# Patient Record
Sex: FEMALE | Race: WHITE | Employment: FULL TIME | URBAN - METROPOLITAN AREA
[De-identification: names, ages, dates, MRNs, and addresses within clinical notes are randomized per-mention and may not be internally consistent; named-entity substitution may affect disease eponyms.]

---

## 2018-04-17 ENCOUNTER — HOSPITAL ENCOUNTER (EMERGENCY)
Age: 48
Discharge: HOME | End: 2018-04-17
Payer: COMMERCIAL

## 2018-04-17 ENCOUNTER — APPOINTMENT (OUTPATIENT)
Dept: GENERAL RADIOLOGY | Age: 48
End: 2018-04-17
Payer: COMMERCIAL

## 2018-04-17 VITALS
SYSTOLIC BLOOD PRESSURE: 181 MMHG | BODY MASS INDEX: 29.82 KG/M2 | OXYGEN SATURATION: 96 % | DIASTOLIC BLOOD PRESSURE: 82 MMHG | RESPIRATION RATE: 16 BRPM | TEMPERATURE: 98.6 F | WEIGHT: 190 LBS | HEART RATE: 45 BPM | HEIGHT: 67 IN

## 2018-04-17 DIAGNOSIS — S40.029A CONTUSION, SHOULDER AND UPPER ARM, MULTIPLE SITES, UNSPECIFIED LATERALITY, INITIAL ENCOUNTER: ICD-10-CM

## 2018-04-17 DIAGNOSIS — T14.8XXA HEMATOMA: ICD-10-CM

## 2018-04-17 DIAGNOSIS — S40.019A CONTUSION, SHOULDER AND UPPER ARM, MULTIPLE SITES, UNSPECIFIED LATERALITY, INITIAL ENCOUNTER: ICD-10-CM

## 2018-04-17 DIAGNOSIS — S90.30XA CONTUSION OF FOOT, UNSPECIFIED LATERALITY, INITIAL ENCOUNTER: Primary | ICD-10-CM

## 2018-04-17 PROCEDURE — 99283 EMERGENCY DEPT VISIT LOW MDM: CPT

## 2018-04-17 PROCEDURE — 73630 X-RAY EXAM OF FOOT: CPT

## 2018-04-17 PROCEDURE — 73080 X-RAY EXAM OF ELBOW: CPT

## 2018-04-17 PROCEDURE — 73060 X-RAY EXAM OF HUMERUS: CPT

## 2018-04-17 RX ORDER — TRAMADOL HYDROCHLORIDE 50 MG/1
50 TABLET ORAL EVERY 6 HOURS PRN
Qty: 6 TABLET | Refills: 0 | Status: SHIPPED | OUTPATIENT
Start: 2018-04-17 | End: 2018-04-22

## 2018-04-17 ASSESSMENT — PAIN DESCRIPTION - DESCRIPTORS: DESCRIPTORS: ACHING

## 2018-04-17 ASSESSMENT — PAIN DESCRIPTION - PAIN TYPE: TYPE: ACUTE PAIN

## 2018-04-17 ASSESSMENT — PAIN DESCRIPTION - LOCATION: LOCATION: ARM

## 2018-04-17 ASSESSMENT — PAIN SCALES - GENERAL: PAINLEVEL_OUTOF10: 7

## 2018-11-30 ENCOUNTER — APPOINTMENT (OUTPATIENT)
Dept: CT IMAGING | Age: 48
End: 2018-11-30
Payer: COMMERCIAL

## 2018-11-30 ENCOUNTER — HOSPITAL ENCOUNTER (EMERGENCY)
Age: 48
Discharge: HOME | End: 2018-11-30
Payer: COMMERCIAL

## 2018-11-30 VITALS
RESPIRATION RATE: 18 BRPM | OXYGEN SATURATION: 97 % | TEMPERATURE: 98.3 F | BODY MASS INDEX: 29.71 KG/M2 | HEART RATE: 54 BPM | DIASTOLIC BLOOD PRESSURE: 88 MMHG | HEIGHT: 67 IN | WEIGHT: 189.31 LBS | SYSTOLIC BLOOD PRESSURE: 145 MMHG

## 2018-11-30 DIAGNOSIS — N20.0 KIDNEY STONE: Primary | ICD-10-CM

## 2018-11-30 LAB
BACTERIA: ABNORMAL /HPF
BASOPHILS ABSOLUTE: 0.04 E9/L (ref 0–0.2)
BASOPHILS RELATIVE PERCENT: 0.5 % (ref 0–2)
BILIRUBIN URINE: NEGATIVE
BLOOD, URINE: ABNORMAL
CHP ED QC CHECK: YES
CLARITY: ABNORMAL
CO2: 28 MMOL/L (ref 22–29)
COLOR: YELLOW
EOSINOPHILS ABSOLUTE: 0.2 E9/L (ref 0.05–0.5)
EOSINOPHILS RELATIVE PERCENT: 2.4 % (ref 0–6)
EPITHELIAL CELLS, UA: ABNORMAL /HPF
GFR AFRICAN AMERICAN: >60
GFR NON-AFRICAN AMERICAN: >60 ML/MIN/1.73
GLUCOSE BLD-MCNC: 100 MG/DL (ref 74–99)
GLUCOSE URINE: NEGATIVE MG/DL
HCT VFR BLD CALC: 42.7 % (ref 34–48)
HEMOGLOBIN: 14 G/DL (ref 11.5–15.5)
IMMATURE GRANULOCYTES #: 0.05 E9/L
IMMATURE GRANULOCYTES %: 0.6 % (ref 0–5)
KETONES, URINE: NEGATIVE MG/DL
LEUKOCYTE ESTERASE, URINE: NEGATIVE
LYMPHOCYTES ABSOLUTE: 1.14 E9/L (ref 1.5–4)
LYMPHOCYTES RELATIVE PERCENT: 14 % (ref 20–42)
MCH RBC QN AUTO: 28.1 PG (ref 26–35)
MCHC RBC AUTO-ENTMCNC: 32.8 % (ref 32–34.5)
MCV RBC AUTO: 85.6 FL (ref 80–99.9)
MONOCYTES ABSOLUTE: 0.61 E9/L (ref 0.1–0.95)
MONOCYTES RELATIVE PERCENT: 7.5 % (ref 2–12)
NEUTROPHILS ABSOLUTE: 6.13 E9/L (ref 1.8–7.3)
NEUTROPHILS RELATIVE PERCENT: 75 % (ref 43–80)
NITRITE, URINE: NEGATIVE
PDW BLD-RTO: 13 FL (ref 11.5–15)
PH UA: 7 (ref 5–9)
PLATELET # BLD: 219 E9/L (ref 130–450)
PMV BLD AUTO: 10.5 FL (ref 7–12)
POC ANION GAP: 11 MMOL/L (ref 7–16)
POC BUN: 25 MG/DL (ref 8–23)
POC CHLORIDE: 104 MMOL/L (ref 100–108)
POC CREATININE: 0.8 MG/DL (ref 0.5–1)
POC POTASSIUM: 4 MMOL/L (ref 3.5–5)
POC SODIUM: 143 MMOL/L (ref 132–146)
PREGNANCY TEST URINE, POC: NEGATIVE
PROTEIN UA: NEGATIVE MG/DL
RBC # BLD: 4.99 E12/L (ref 3.5–5.5)
RBC UA: >20 /HPF (ref 0–2)
SPECIFIC GRAVITY UA: 1.01 (ref 1–1.03)
UROBILINOGEN, URINE: 1 E.U./DL
WBC # BLD: 8.2 E9/L (ref 4.5–11.5)
WBC UA: ABNORMAL /HPF (ref 0–5)

## 2018-11-30 PROCEDURE — 85025 COMPLETE CBC W/AUTO DIFF WBC: CPT

## 2018-11-30 PROCEDURE — 6360000002 HC RX W HCPCS: Performed by: PHYSICIAN ASSISTANT

## 2018-11-30 PROCEDURE — 82947 ASSAY GLUCOSE BLOOD QUANT: CPT

## 2018-11-30 PROCEDURE — 74176 CT ABD & PELVIS W/O CONTRAST: CPT

## 2018-11-30 PROCEDURE — 82565 ASSAY OF CREATININE: CPT

## 2018-11-30 PROCEDURE — 81001 URINALYSIS AUTO W/SCOPE: CPT

## 2018-11-30 PROCEDURE — 99284 EMERGENCY DEPT VISIT MOD MDM: CPT

## 2018-11-30 PROCEDURE — 80051 ELECTROLYTE PANEL: CPT

## 2018-11-30 PROCEDURE — 36415 COLL VENOUS BLD VENIPUNCTURE: CPT

## 2018-11-30 PROCEDURE — 96375 TX/PRO/DX INJ NEW DRUG ADDON: CPT

## 2018-11-30 PROCEDURE — 2580000003 HC RX 258: Performed by: PHYSICIAN ASSISTANT

## 2018-11-30 PROCEDURE — 84520 ASSAY OF UREA NITROGEN: CPT

## 2018-11-30 PROCEDURE — 96374 THER/PROPH/DIAG INJ IV PUSH: CPT

## 2018-11-30 RX ORDER — HYDROCODONE BITARTRATE AND ACETAMINOPHEN 5; 325 MG/1; MG/1
1 TABLET ORAL EVERY 6 HOURS PRN
Qty: 12 TABLET | Refills: 0 | Status: SHIPPED | OUTPATIENT
Start: 2018-11-30 | End: 2018-12-03

## 2018-11-30 RX ORDER — TAMSULOSIN HYDROCHLORIDE 0.4 MG/1
0.4 CAPSULE ORAL DAILY
Qty: 14 CAPSULE | Refills: 0 | Status: SHIPPED | OUTPATIENT
Start: 2018-11-30 | End: 2021-06-16 | Stop reason: ALTCHOICE

## 2018-11-30 RX ORDER — 0.9 % SODIUM CHLORIDE 0.9 %
1000 INTRAVENOUS SOLUTION INTRAVENOUS ONCE
Status: COMPLETED | OUTPATIENT
Start: 2018-11-30 | End: 2018-11-30

## 2018-11-30 RX ORDER — ONDANSETRON 2 MG/ML
4 INJECTION INTRAMUSCULAR; INTRAVENOUS ONCE
Status: COMPLETED | OUTPATIENT
Start: 2018-11-30 | End: 2018-11-30

## 2018-11-30 RX ORDER — ONDANSETRON 4 MG/1
4 TABLET, ORALLY DISINTEGRATING ORAL EVERY 8 HOURS PRN
Qty: 10 TABLET | Refills: 0 | Status: SHIPPED | OUTPATIENT
Start: 2018-11-30 | End: 2021-06-16 | Stop reason: ALTCHOICE

## 2018-11-30 RX ORDER — KETOROLAC TROMETHAMINE 15 MG/ML
15 INJECTION, SOLUTION INTRAMUSCULAR; INTRAVENOUS ONCE
Status: COMPLETED | OUTPATIENT
Start: 2018-11-30 | End: 2018-11-30

## 2018-11-30 RX ORDER — PROPRANOLOL HYDROCHLORIDE 10 MG/1
10 TABLET ORAL 3 TIMES DAILY
COMMUNITY

## 2018-11-30 RX ADMIN — KETOROLAC TROMETHAMINE 15 MG: 15 INJECTION, SOLUTION INTRAMUSCULAR; INTRAVENOUS at 13:37

## 2018-11-30 RX ADMIN — ONDANSETRON 4 MG: 2 INJECTION INTRAMUSCULAR; INTRAVENOUS at 13:41

## 2018-11-30 RX ADMIN — SODIUM CHLORIDE 1000 ML: 9 INJECTION, SOLUTION INTRAVENOUS at 13:37

## 2018-11-30 ASSESSMENT — PAIN DESCRIPTION - PAIN TYPE: TYPE: ACUTE PAIN

## 2018-11-30 ASSESSMENT — PAIN SCALES - GENERAL
PAINLEVEL_OUTOF10: 8
PAINLEVEL_OUTOF10: 8

## 2018-11-30 ASSESSMENT — PAIN DESCRIPTION - LOCATION: LOCATION: FLANK

## 2018-11-30 ASSESSMENT — PAIN DESCRIPTION - DESCRIPTORS: DESCRIPTORS: SHARP;SHOOTING

## 2018-11-30 ASSESSMENT — PAIN DESCRIPTION - ORIENTATION: ORIENTATION: LEFT

## 2018-11-30 NOTE — ED PROVIDER NOTES
Negative mg/dL    Specific Gravity, UA 1.015 1.005 - 1.030    Blood, Urine LARGE (A) Negative    pH, UA 7.0 5.0 - 9.0    Protein, UA Negative Negative mg/dL    Urobilinogen, Urine 1.0 <2.0 E.U./dL    Nitrite, Urine Negative Negative    Leukocyte Esterase, Urine Negative Negative   Microscopic Urinalysis   Result Value Ref Range    WBC, UA 0-1 0 - 5 /HPF    RBC, UA >20 0 - 2 /HPF    Epi Cells RARE /HPF    Bacteria, UA FEW (A) /HPF   POC Pregnancy Urine Qual   Result Value Ref Range    Preg Test, Ur negative     QC OK? yes    POCT Venous   Result Value Ref Range    POC Sodium 143 132 - 146 mmol/L    POC Potassium 4.0 3.5 - 5.0 mmol/L    POC Chloride 104 100 - 108 mmol/L    CO2 28 22 - 29 mmol/L    POC Anion Gap 11 7 - 16 mmol/L    POC Glucose 100 (H) 74 - 99 mg/dl    POC BUN 25 (H) 8 - 23 mg/dL    POC Creatinine 0.8 0.5 - 1.0 mg/dL    GFR Non-African American >60 >=60 mL/min/1.73    GFR  >60        Imaging: All Radiology results interpreted by Radiologist unless otherwise noted. CT ABDOMEN PELVIS WO CONTRAST   Final Result   1. Small obstructing stone in the distal left ureter. 2. Nonspecific right hydronephrosis possibly related to a low-grade   UPJ. ED Course / Medical Decision Making     Medications   0.9 % sodium chloride bolus (1,000 mLs Intravenous New Bag 11/30/18 1337)   ketorolac (TORADOL) injection 15 mg (15 mg Intravenous Given 11/30/18 1337)   ondansetron (ZOFRAN) injection 4 mg (4 mg Intravenous Given 11/30/18 1341)        Re-examination:  11/30/18       Time: 1430   Improved. Reports pain is down to a 1/10. Feeling much better. Updated on results. Consult(s):   None    Procedure(s):   none. MDM:   Patient appears well. 3mm kidney stone present. Labs, UA are essentially unremarkable otherwise. Patient has good pain control. Advised to return to the ER if worse in any way. Otherwise to f/u w/urology. Counseling:     The emergency provider has spoken with the patient and

## 2018-11-30 NOTE — ED NOTES
Pt. C/o L.  Sided flank pain since this morning     David Yu [facility], RN  11/30/18 8158

## 2021-06-15 ENCOUNTER — HOSPITAL ENCOUNTER (EMERGENCY)
Age: 51
Discharge: HOME | End: 2021-06-16
Attending: EMERGENCY MEDICINE
Payer: COMMERCIAL

## 2021-06-15 DIAGNOSIS — N20.0 KIDNEY STONE: Primary | ICD-10-CM

## 2021-06-15 DIAGNOSIS — R82.71 ASB (ASYMPTOMATIC BACTERIURIA): ICD-10-CM

## 2021-06-15 PROCEDURE — 99284 EMERGENCY DEPT VISIT MOD MDM: CPT

## 2021-06-15 PROCEDURE — 96375 TX/PRO/DX INJ NEW DRUG ADDON: CPT

## 2021-06-15 PROCEDURE — 96374 THER/PROPH/DIAG INJ IV PUSH: CPT

## 2021-06-15 ASSESSMENT — PAIN SCALES - GENERAL: PAINLEVEL_OUTOF10: 7

## 2021-06-16 ENCOUNTER — APPOINTMENT (OUTPATIENT)
Dept: CT IMAGING | Age: 51
End: 2021-06-16
Payer: COMMERCIAL

## 2021-06-16 VITALS
TEMPERATURE: 97.4 F | OXYGEN SATURATION: 95 % | HEIGHT: 67 IN | DIASTOLIC BLOOD PRESSURE: 93 MMHG | WEIGHT: 204 LBS | HEART RATE: 62 BPM | BODY MASS INDEX: 32.02 KG/M2 | RESPIRATION RATE: 18 BRPM | SYSTOLIC BLOOD PRESSURE: 154 MMHG

## 2021-06-16 LAB
ALBUMIN SERPL-MCNC: 4.3 G/DL (ref 3.5–5.2)
ALP BLD-CCNC: 58 U/L (ref 35–104)
ALT SERPL-CCNC: 27 U/L (ref 0–32)
ANION GAP SERPL CALCULATED.3IONS-SCNC: 13 MMOL/L (ref 7–16)
AST SERPL-CCNC: 26 U/L (ref 0–31)
BACTERIA: ABNORMAL /HPF
BASOPHILS ABSOLUTE: 0.04 E9/L (ref 0–0.2)
BASOPHILS RELATIVE PERCENT: 0.5 % (ref 0–2)
BILIRUB SERPL-MCNC: 0.4 MG/DL (ref 0–1.2)
BILIRUBIN URINE: NEGATIVE
BLOOD, URINE: ABNORMAL
BUN BLDV-MCNC: 29 MG/DL (ref 6–20)
CALCIUM SERPL-MCNC: 9.8 MG/DL (ref 8.6–10.2)
CHLORIDE BLD-SCNC: 99 MMOL/L (ref 98–107)
CLARITY: CLEAR
CO2: 24 MMOL/L (ref 22–29)
COLOR: ABNORMAL
CREAT SERPL-MCNC: 0.9 MG/DL (ref 0.5–1)
EOSINOPHILS ABSOLUTE: 0.22 E9/L (ref 0.05–0.5)
EOSINOPHILS RELATIVE PERCENT: 2.6 % (ref 0–6)
GFR AFRICAN AMERICAN: >60
GFR NON-AFRICAN AMERICAN: >60 ML/MIN/1.73
GLUCOSE BLD-MCNC: 109 MG/DL (ref 74–99)
GLUCOSE URINE: NEGATIVE MG/DL
HCT VFR BLD CALC: 41.5 % (ref 34–48)
HEMOGLOBIN: 14 G/DL (ref 11.5–15.5)
IMMATURE GRANULOCYTES #: 0.02 E9/L
IMMATURE GRANULOCYTES %: 0.2 % (ref 0–5)
KETONES, URINE: NEGATIVE MG/DL
LEUKOCYTE ESTERASE, URINE: ABNORMAL
LYMPHOCYTES ABSOLUTE: 1.65 E9/L (ref 1.5–4)
LYMPHOCYTES RELATIVE PERCENT: 19.5 % (ref 20–42)
MCH RBC QN AUTO: 28.2 PG (ref 26–35)
MCHC RBC AUTO-ENTMCNC: 33.7 % (ref 32–34.5)
MCV RBC AUTO: 83.7 FL (ref 80–99.9)
MONOCYTES ABSOLUTE: 0.85 E9/L (ref 0.1–0.95)
MONOCYTES RELATIVE PERCENT: 10 % (ref 2–12)
NEUTROPHILS ABSOLUTE: 5.68 E9/L (ref 1.8–7.3)
NEUTROPHILS RELATIVE PERCENT: 67.2 % (ref 43–80)
NITRITE, URINE: NEGATIVE
PDW BLD-RTO: 12.8 FL (ref 11.5–15)
PH UA: 7.5 (ref 5–9)
PLATELET # BLD: 225 E9/L (ref 130–450)
PMV BLD AUTO: 10.2 FL (ref 7–12)
POTASSIUM REFLEX MAGNESIUM: 4.1 MMOL/L (ref 3.5–5)
PROTEIN UA: NEGATIVE MG/DL
RBC # BLD: 4.96 E12/L (ref 3.5–5.5)
RBC UA: ABNORMAL /HPF (ref 0–2)
SODIUM BLD-SCNC: 136 MMOL/L (ref 132–146)
SPECIFIC GRAVITY UA: 1.01 (ref 1–1.03)
TOTAL PROTEIN: 7.4 G/DL (ref 6.4–8.3)
UROBILINOGEN, URINE: 0.2 E.U./DL
WBC # BLD: 8.5 E9/L (ref 4.5–11.5)
WBC UA: ABNORMAL /HPF (ref 0–5)

## 2021-06-16 PROCEDURE — 74176 CT ABD & PELVIS W/O CONTRAST: CPT

## 2021-06-16 PROCEDURE — 80053 COMPREHEN METABOLIC PANEL: CPT

## 2021-06-16 PROCEDURE — 6360000002 HC RX W HCPCS: Performed by: STUDENT IN AN ORGANIZED HEALTH CARE EDUCATION/TRAINING PROGRAM

## 2021-06-16 PROCEDURE — 2580000003 HC RX 258: Performed by: STUDENT IN AN ORGANIZED HEALTH CARE EDUCATION/TRAINING PROGRAM

## 2021-06-16 PROCEDURE — 81001 URINALYSIS AUTO W/SCOPE: CPT

## 2021-06-16 PROCEDURE — 85025 COMPLETE CBC W/AUTO DIFF WBC: CPT

## 2021-06-16 PROCEDURE — 36415 COLL VENOUS BLD VENIPUNCTURE: CPT

## 2021-06-16 PROCEDURE — 87088 URINE BACTERIA CULTURE: CPT

## 2021-06-16 RX ORDER — ONDANSETRON 2 MG/ML
4 INJECTION INTRAMUSCULAR; INTRAVENOUS ONCE
Status: COMPLETED | OUTPATIENT
Start: 2021-06-16 | End: 2021-06-16

## 2021-06-16 RX ORDER — KETOROLAC TROMETHAMINE 10 MG/1
10 TABLET, FILM COATED ORAL EVERY 6 HOURS PRN
Qty: 20 TABLET | Refills: 0 | Status: SHIPPED | OUTPATIENT
Start: 2021-06-16 | End: 2021-06-21

## 2021-06-16 RX ORDER — ONDANSETRON 4 MG/1
4 TABLET, ORALLY DISINTEGRATING ORAL EVERY 8 HOURS PRN
Qty: 24 TABLET | Refills: 0 | Status: SHIPPED | OUTPATIENT
Start: 2021-06-16

## 2021-06-16 RX ORDER — 0.9 % SODIUM CHLORIDE 0.9 %
1000 INTRAVENOUS SOLUTION INTRAVENOUS ONCE
Status: COMPLETED | OUTPATIENT
Start: 2021-06-16 | End: 2021-06-16

## 2021-06-16 RX ORDER — KETOROLAC TROMETHAMINE 30 MG/ML
15 INJECTION, SOLUTION INTRAMUSCULAR; INTRAVENOUS ONCE
Status: COMPLETED | OUTPATIENT
Start: 2021-06-16 | End: 2021-06-16

## 2021-06-16 RX ORDER — KETOROLAC TROMETHAMINE 30 MG/ML
INJECTION, SOLUTION INTRAMUSCULAR; INTRAVENOUS
Status: DISCONTINUED
Start: 2021-06-16 | End: 2021-06-16 | Stop reason: HOSPADM

## 2021-06-16 RX ORDER — ONDANSETRON 2 MG/ML
INJECTION INTRAMUSCULAR; INTRAVENOUS
Status: DISCONTINUED
Start: 2021-06-16 | End: 2021-06-16 | Stop reason: HOSPADM

## 2021-06-16 RX ORDER — TAMSULOSIN HYDROCHLORIDE 0.4 MG/1
0.4 CAPSULE ORAL DAILY
Qty: 14 CAPSULE | Refills: 0 | Status: SHIPPED | OUTPATIENT
Start: 2021-06-16 | End: 2021-06-30

## 2021-06-16 RX ORDER — HYDROCODONE BITARTRATE AND ACETAMINOPHEN 5; 325 MG/1; MG/1
1 TABLET ORAL EVERY 8 HOURS PRN
Qty: 9 TABLET | Refills: 0 | Status: SHIPPED | OUTPATIENT
Start: 2021-06-16 | End: 2021-06-19

## 2021-06-16 RX ADMIN — SODIUM CHLORIDE 1000 ML: 9 INJECTION, SOLUTION INTRAVENOUS at 00:11

## 2021-06-16 RX ADMIN — KETOROLAC TROMETHAMINE 15 MG: 30 INJECTION, SOLUTION INTRAMUSCULAR; INTRAVENOUS at 00:11

## 2021-06-16 RX ADMIN — ONDANSETRON 4 MG: 2 INJECTION INTRAMUSCULAR; INTRAVENOUS at 00:11

## 2021-06-16 ASSESSMENT — PAIN SCALES - GENERAL: PAINLEVEL_OUTOF10: 9

## 2021-06-16 NOTE — ED PROVIDER NOTES
Department of Emergency Medicine   ED  Provider Note  Admit Date/RoomTime: 6/15/2021 11:55 PM  ED Room: BRENT/BRENT          History of Present Illness:  6/16/21, Time: 12:02 AM EDT  Chief Complaint   Patient presents with    Flank Pain     x 1 day. L sided. Hx of stone. Pilar Rand is a 45 y.o. female presenting to the ED for flank pain, beginning yesterday. The complaint has been persistent, moderate in severity, and worsened by nothing. The patient is a 50-year-old female who presents to the emergency department complaining of flank pain. Patient symptoms were sudden onset yesterday, they have been persistent, moderate severity, and nothing makes it better or worse. She states that she tried taking Excedrin at home with minimal relief. Her pain is primarily located in the left flank region and left mid back. She describes as an aching sensation. She states that it does radiate around into her left lower abdomen. She does complain of some mild intermittent nausea. She states that she has had symptoms like this in the past and does have a history of kidney stones. She states that she has been able to pass her kidney stones on her own before and has never had a stent put in for lithotripsy. She denies any fever, chills, dysuria, hematuria, increased urinary frequency, vomiting, recent hospitalization, recent surgery, recent illness, or other acute symptoms or concerns. Review of Systems:   Pertinent positives and negatives are stated within HPI, all other systems reviewed and are negative.        --------------------------------------------- PAST HISTORY ---------------------------------------------  Past Medical History:  has a past medical history of Anemia, Hiatal hernia, Hyperlipidemia, and Thyroid disease. Past Surgical History:  has a past surgical history that includes Mandible surgery; sinus surgery; and Endometrial ablation.     Social History:  reports that she has never smoked. She has never used smokeless tobacco. She reports that she does not drink alcohol and does not use drugs. Family History: family history is not on file. . Unless otherwise noted, family history is non contributory    The patients home medications have been reviewed. Allergies: Patient has no known allergies. I have reviewed the past medical history, past surgical history, social history, and family history    ---------------------------------------------------PHYSICAL EXAM--------------------------------------    Constitutional/General: Alert and oriented x3  Head: Normocephalic and atraumatic  Eyes:  EOMI, sclera non icteric  Mouth: Oropharynx clear, handling secretions, no trismus, no asymmetry of the posterior oropharynx or uvular edema  Neck: Supple, full ROM, no stridor, no meningeal signs  Respiratory: Lungs clear to auscultation bilaterally, no wheezes, rales, or rhonchi. Not in respiratory distress  Cardiovascular:  Regular rate. Regular rhythm. No murmurs, no aortic murmurs, no gallops, or rubs  Chest: No chest wall tenderness  Gastrointestinal: Mild tenderness palpation over the left CVA and left upper quadrant with minimal tenderness over the left lower quadrant. There is no rigidity, no rebound tenderness, no guarding. Musculoskeletal: Moves all extremities x 4. Warm and well perfused, no clubbing, no cyanosis, no edema. Capillary refill <3 seconds  Skin: skin warm and dry. No rashes. Neurologic: GCS 15, no focal deficits, symmetric strength 5/5 in the upper and lower extremities bilaterally  Psychiatric: Normal Affect    -------------------------------------------------- RESULTS -------------------------------------------------  I have personally reviewed all laboratory and imaging results for this patient. Results are listed below.      LABS: (Lab results interpreted by me)  Results for orders placed or performed during the hospital encounter of 06/15/21   CBC auto differential Result Value Ref Range    WBC 8.5 4.5 - 11.5 E9/L    RBC 4.96 3.50 - 5.50 E12/L    Hemoglobin 14.0 11.5 - 15.5 g/dL    Hematocrit 41.5 34.0 - 48.0 %    MCV 83.7 80.0 - 99.9 fL    MCH 28.2 26.0 - 35.0 pg    MCHC 33.7 32.0 - 34.5 %    RDW 12.8 11.5 - 15.0 fL    Platelets 028 330 - 609 E9/L    MPV 10.2 7.0 - 12.0 fL    Neutrophils % 67.2 43.0 - 80.0 %    Immature Granulocytes % 0.2 0.0 - 5.0 %    Lymphocytes % 19.5 (L) 20.0 - 42.0 %    Monocytes % 10.0 2.0 - 12.0 %    Eosinophils % 2.6 0.0 - 6.0 %    Basophils % 0.5 0.0 - 2.0 %    Neutrophils Absolute 5.68 1.80 - 7.30 E9/L    Immature Granulocytes # 0.02 E9/L    Lymphocytes Absolute 1.65 1.50 - 4.00 E9/L    Monocytes Absolute 0.85 0.10 - 0.95 E9/L    Eosinophils Absolute 0.22 0.05 - 0.50 E9/L    Basophils Absolute 0.04 0.00 - 0.20 E9/L   Comprehensive Metabolic Panel w/ Reflex to MG   Result Value Ref Range    Sodium 136 132 - 146 mmol/L    Potassium reflex Magnesium 4.1 3.5 - 5.0 mmol/L    Chloride 99 98 - 107 mmol/L    CO2 24 22 - 29 mmol/L    Anion Gap 13 7 - 16 mmol/L    Glucose 109 (H) 74 - 99 mg/dL    BUN 29 (H) 6 - 20 mg/dL    CREATININE 0.9 0.5 - 1.0 mg/dL    GFR Non-African American >60 >=60 mL/min/1.73    GFR African American >60     Calcium 9.8 8.6 - 10.2 mg/dL    Total Protein 7.4 6.4 - 8.3 g/dL    Albumin 4.3 3.5 - 5.2 g/dL    Total Bilirubin 0.4 0.0 - 1.2 mg/dL    Alkaline Phosphatase 58 35 - 104 U/L    ALT 27 0 - 32 U/L    AST 26 0 - 31 U/L   Urinalysis with Microscopic   Result Value Ref Range    Color, UA Straw Straw/Yellow    Clarity, UA Clear Clear    Glucose, Ur Negative Negative mg/dL    Bilirubin Urine Negative Negative    Ketones, Urine Negative Negative mg/dL    Specific Gravity, UA 1.015 1.005 - 1.030    Blood, Urine SMALL (A) Negative    pH, UA 7.5 5.0 - 9.0    Protein, UA Negative Negative mg/dL    Urobilinogen, Urine 0.2 <2.0 E.U./dL    Nitrite, Urine Negative Negative    Leukocyte Esterase, Urine MODERATE (A) Negative    WBC, UA 0-1 0 - 5 emergency department for evaluation of Flank Pain (x 1 day. L sided. Hx of stone. )   and was initially evaluated by the Medical Resident. See Original ED Note for H&P and ED course above. I have reviewed and discussed the case, including pertinent history (medical, surgical, family and social) and exam findings with the Medical Resident assigned to Amari Luciano. I have personally performed and/or participated in the history, exam, medical decision making, and procedures and agree with all pertinent clinical information. I have reviewed my findings and recommendations with the assigned Medical Resident, Amari Luciano and members of family present at the time of disposition. My findings/plan: The primary encounter diagnosis was Kidney stone. A diagnosis of ASB (asymptomatic bacteriuria) was also pertinent to this visit. Discharge Medication List as of 6/16/2021  1:44 AM      START taking these medications    Details   ondansetron (ZOFRAN ODT) 4 MG disintegrating tablet Take 1 tablet by mouth every 8 hours as needed for Nausea or Vomiting, Disp-24 tablet, R-0Normal      tamsulosin (FLOMAX) 0.4 MG capsule Take 1 capsule by mouth daily for 14 days, Disp-14 capsule, R-0Normal      ketorolac (TORADOL) 10 MG tablet Take 1 tablet by mouth every 6 hours as needed for Pain This is a continuation of medication patient received IM/IV in the ED without adverse reaction, Disp-20 tablet, R-0Normal      HYDROcodone-acetaminophen (NORCO) 5-325 MG per tablet Take 1 tablet by mouth every 8 hours as needed for Pain for up to 3 days. Intended supply: 3 days.  Take lowest dose possible to manage pain, Disp-9 tablet, R-0Print           DO Dave Chávez [place], [place]  06/16/21 0111

## 2021-06-18 LAB — URINE CULTURE, ROUTINE: NORMAL

## 2022-12-22 RX ORDER — SUMATRIPTAN 50 MG/1
50 TABLET, FILM COATED ORAL
COMMUNITY

## 2022-12-22 RX ORDER — PRAVASTATIN SODIUM 40 MG
40 TABLET ORAL DAILY
COMMUNITY

## 2022-12-22 RX ORDER — PROPRANOLOL HYDROCHLORIDE 80 MG/1
80 CAPSULE, EXTENDED RELEASE ORAL DAILY
COMMUNITY

## 2022-12-22 RX ORDER — ZINC GLUCONATE 50 MG
50 TABLET ORAL DAILY
COMMUNITY

## 2022-12-22 RX ORDER — HYDROCHLOROTHIAZIDE 25 MG/1
25 TABLET ORAL DAILY
COMMUNITY

## 2023-01-03 ENCOUNTER — ANESTHESIA EVENT (OUTPATIENT)
Dept: OPERATING ROOM | Age: 53
End: 2023-01-03
Payer: COMMERCIAL

## 2023-01-03 NOTE — ANESTHESIA PRE PROCEDURE
Department of Anesthesiology  Preprocedure Note       Name:  Isabel Matos   Age:  46 y.o.  :  1970                                          MRN:  [id number]         Date:  2023      Surgeon: Sheldon Reddy):  Sarah Duff DPM    Procedure: Procedure(s):  CORRECTION OF RIGHT BUNION WITH FIRST TARSO METATARSAL ARTHRODESIS,AKIN'S FIRST MPJ MEDIAL EXOSTECTOMY AND CORRECTION OF SECOND DIGIT HAMMER TOE (CPT 47550,19745,22527,46996) C-ARM,PERCY HADRWARE, 39 [address], SAGITAL SAW, WIRE ,ANKLE TOURNIQUET    Medications prior to admission:   Prior to Admission medications    Medication Sig Start Date End Date Taking? Authorizing Provider   propranolol (INDERAL LA) 80 MG extended release capsule Take 80 mg by mouth daily For migraines   Yes Historical Provider, MD   hydroCHLOROthiazide (HYDRODIURIL) 25 MG tablet Take 25 mg by mouth daily   Yes Historical Provider, MD   pravastatin (PRAVACHOL) 40 MG tablet Take 40 mg by mouth daily   Yes Historical Provider, MD   zinc 50 MG TABS tablet Take 50 mg by mouth daily   Yes Historical Provider, MD   ELDERBERRY PO Take 1,000 mg by mouth daily   Yes Historical Provider, MD   SUMAtriptan (IMITREX) 50 MG tablet Take 50 mg by mouth once as needed for Migraine   Yes Historical Provider, MD   propranolol (INDERAL) 10 MG tablet Take 10 mg by mouth 3 times daily Patient does not know dosage  Patient not taking: Reported on 2022    Historical Provider, MD   EPINEPHrine (EPIPEN 2-TIMOTHY) 0.3 MG/0.3ML SOAJ injection Inject 0.3 mLs into the muscle once for 1 dose Use as directed for allergic reaction 16  [place], DO   Hypertonic Nasal Wash (SINUS RINSE BOTTLE KIT) PACK Use 1-2 times daily as needed for sinus pressure and drainage 11   Geena Scott, DO   levothyroxine (SYNTHROID) 100 MCG tablet Take 100 mcg by mouth daily.       Historical Provider, MD       Current medications:    Current Facility-Administered Medications   Medication Dose Route Frequency Provider Last Rate Last Admin    lactated ringers infusion   IntraVENous Continuous Tyler Porter MD        ceFAZolin (ANCEF) 2,000 mg in sterile water 20 mL IV syringe  2,000 mg IntraVENous Once Tee Carson DPM           Allergies:  No Known Allergies    Problem List:    Patient Active Problem List   Diagnosis Code    Shoulder impingement M75.40    Shoulder pain M25.519    Rotator cuff tendonitis M75.80       Past Medical History:        Diagnosis Date    Anemia     Hiatal hernia     History of blood transfusion     Hyperlipidemia     Kidney stones     Migraines     Shingles 2011    Thyroid disease     hypothyroidism       Past Surgical History:        Procedure Laterality Date    COLONOSCOPY      ENDOMETRIAL ABLATION      ENDOSCOPY, COLON, DIAGNOSTIC      MANDIBLE SURGERY      SINUS SURGERY      TONSILLECTOMY         Social History:    Social History     Tobacco Use    Smoking status: Never    Smokeless tobacco: Never   Substance Use Topics    Alcohol use: No                                Counseling given: Not Answered      Vital Signs (Current):   Vitals:    12/22/22 1034 01/04/23 0708   BP:  134/66   Pulse:  67   Resp:  16   Temp:  97.8 °F (36.6 °C)   TempSrc:  Skin   SpO2:  96%   Weight: 175 lb (79.4 kg) 183 lb (83 kg)   Height: 5' 7\" (1.702 m) 5' 7\" (1.702 m)                                              BP Readings from Last 3 Encounters:   01/04/23 134/66   06/16/21 (!) 154/93   11/30/18 (!) 145/88       NPO Status: Time of last liquid consumption: 2100                        Time of last solid consumption: 2100                        Date of last liquid consumption: 01/03/23                        Date of last solid food consumption: 01/03/23    BMI:   Wt Readings from Last 3 Encounters:   01/04/23 183 lb (83 kg)   06/15/21 204 lb (92.5 kg)   11/30/18 189 lb 5 oz (85.9 kg)     Body mass index is 28.66 kg/m².     CBC:   Lab Results   Component Value Date/Time    WBC 8.5 06/16/2021 12:05 AM    RBC 4.96 06/16/2021 12:05 AM    HGB 14.0 06/16/2021 12:05 AM    HCT 41.5 06/16/2021 12:05 AM    MCV 83.7 06/16/2021 12:05 AM    RDW 12.8 06/16/2021 12:05 AM     06/16/2021 12:05 AM       CMP:   Lab Results   Component Value Date/Time     06/16/2021 12:05 AM    K 4.1 06/16/2021 12:05 AM    CL 99 06/16/2021 12:05 AM    CO2 24 06/16/2021 12:05 AM    BUN 29 06/16/2021 12:05 AM    CREATININE 0.9 06/16/2021 12:05 AM    GFRAA >60 06/16/2021 12:05 AM    LABGLOM >60 06/16/2021 12:05 AM    GLUCOSE 109 06/16/2021 12:05 AM    PROT 7.4 06/16/2021 12:05 AM    CALCIUM 9.8 06/16/2021 12:05 AM    BILITOT 0.4 06/16/2021 12:05 AM    ALKPHOS 58 06/16/2021 12:05 AM    AST 26 06/16/2021 12:05 AM    ALT 27 06/16/2021 12:05 AM       POC Tests: No results for input(s): POCGLU, POCNA, POCK, POCCL, POCBUN, POCHEMO, POCHCT in the last 72 hours.     Coags: No results found for: PROTIME, INR, APTT    HCG (If Applicable):   Lab Results   Component Value Date    PREGTESTUR negative 11/30/2018        ABGs: No results found for: PHART, PO2ART, OOG2QOH, QZC3KIE, BEART, S8SLGABX     Type & Screen (If Applicable):  No results found for: LABABO, LABRH    Drug/Infectious Status (If Applicable):  No results found for: HIV, HEPCAB    COVID-19 Screening (If Applicable): No results found for: COVID19        Anesthesia Evaluation  Patient summary reviewed no history of anesthetic complications:   Airway: Mallampati: II  TM distance: >3 FB   Neck ROM: full  Mouth opening: > = 3 FB   Dental: normal exam         Pulmonary: breath sounds clear to auscultation                             Cardiovascular:    (+) hypertension:, hyperlipidemia      ECG reviewed  Rhythm: regular             Beta Blocker:  Dose within 24 Hrs         Neuro/Psych:   (+) headaches: migraine headaches,             GI/Hepatic/Renal:   (+) hiatal hernia,           Endo/Other:    (+) hypothyroidism::., .                 Abdominal:             Vascular: negative vascular ROS. Other Findings:           Anesthesia Plan      MAC     ASA 2     (MEDICALLY CLEARED)  Induction: intravenous. MIPS: Postoperative opioids intended and Prophylactic antiemetics administered. Anesthetic plan and risks discussed with patient. Plan discussed with CRNA.               [address] Richie Schneider DO   1/4/2023

## 2023-01-04 ENCOUNTER — HOSPITAL ENCOUNTER (OUTPATIENT)
Age: 53
Setting detail: OUTPATIENT SURGERY
Discharge: HOME | End: 2023-01-04
Attending: PODIATRIST | Admitting: PODIATRIST
Payer: COMMERCIAL

## 2023-01-04 ENCOUNTER — HOSPITAL ENCOUNTER (OUTPATIENT)
Dept: OPERATING ROOM | Age: 53
Setting detail: OUTPATIENT SURGERY
Discharge: HOME | End: 2023-01-04
Attending: PODIATRIST
Payer: COMMERCIAL

## 2023-01-04 ENCOUNTER — ANESTHESIA (OUTPATIENT)
Dept: OPERATING ROOM | Age: 53
End: 2023-01-04
Payer: COMMERCIAL

## 2023-01-04 VITALS
HEART RATE: 56 BPM | HEIGHT: 67 IN | BODY MASS INDEX: 28.72 KG/M2 | SYSTOLIC BLOOD PRESSURE: 118 MMHG | OXYGEN SATURATION: 98 % | TEMPERATURE: 98 F | RESPIRATION RATE: 16 BRPM | DIASTOLIC BLOOD PRESSURE: 67 MMHG | WEIGHT: 183 LBS

## 2023-01-04 DIAGNOSIS — M25.674 FOOT STIFFNESS, RIGHT: ICD-10-CM

## 2023-01-04 DIAGNOSIS — M21.611 BUNION OF RIGHT FOOT: ICD-10-CM

## 2023-01-04 DIAGNOSIS — M25.671 LIMITATION OF JOINT MOTION OF ANKLE, RIGHT: ICD-10-CM

## 2023-01-04 DIAGNOSIS — M20.41 HAMMER TOE OF RIGHT FOOT: ICD-10-CM

## 2023-01-04 DIAGNOSIS — M25.774: ICD-10-CM

## 2023-01-04 PROCEDURE — 2720000010 HC SURG SUPPLY STERILE: Performed by: PODIATRIST

## 2023-01-04 PROCEDURE — 3600000013 HC SURGERY LEVEL 3 ADDTL 15MIN: Performed by: PODIATRIST

## 2023-01-04 PROCEDURE — 6360000002 HC RX W HCPCS: Performed by: PODIATRIST

## 2023-01-04 PROCEDURE — 2580000003 HC RX 258: Performed by: ANESTHESIOLOGY

## 2023-01-04 PROCEDURE — 3700000000 HC ANESTHESIA ATTENDED CARE: Performed by: PODIATRIST

## 2023-01-04 PROCEDURE — 3700000001 HC ADD 15 MINUTES (ANESTHESIA): Performed by: PODIATRIST

## 2023-01-04 PROCEDURE — 2709999900 HC NON-CHARGEABLE SUPPLY: Performed by: PODIATRIST

## 2023-01-04 PROCEDURE — 7100000010 HC PHASE II RECOVERY - FIRST 15 MIN: Performed by: PODIATRIST

## 2023-01-04 PROCEDURE — 6360000002 HC RX W HCPCS: Performed by: NURSE ANESTHETIST, CERTIFIED REGISTERED

## 2023-01-04 PROCEDURE — 3209999900 FLUORO FOR SURGICAL PROCEDURES

## 2023-01-04 PROCEDURE — 2580000003 HC RX 258: Performed by: PODIATRIST

## 2023-01-04 PROCEDURE — 2500000003 HC RX 250 WO HCPCS: Performed by: NURSE ANESTHETIST, CERTIFIED REGISTERED

## 2023-01-04 PROCEDURE — C1713 ANCHOR/SCREW BN/BN,TIS/BN: HCPCS | Performed by: PODIATRIST

## 2023-01-04 PROCEDURE — 7100000011 HC PHASE II RECOVERY - ADDTL 15 MIN: Performed by: PODIATRIST

## 2023-01-04 PROCEDURE — 3600000003 HC SURGERY LEVEL 3 BASE: Performed by: PODIATRIST

## 2023-01-04 PROCEDURE — 2500000003 HC RX 250 WO HCPCS: Performed by: PODIATRIST

## 2023-01-04 DEVICE — IMPLANTABLE DEVICE
Type: IMPLANTABLE DEVICE | Site: FOOT | Status: FUNCTIONAL
Brand: ORTHOLOC™ 2 LAPIFUSE™

## 2023-01-04 DEVICE — IMPLANTABLE DEVICE
Type: IMPLANTABLE DEVICE | Site: FOOT | Status: FUNCTIONAL
Brand: ORTHOLOC 3DI

## 2023-01-04 DEVICE — IMPLANTABLE DEVICE: Type: IMPLANTABLE DEVICE | Site: FOOT | Status: FUNCTIONAL

## 2023-01-04 DEVICE — INSTRUMENT PACK
Type: IMPLANTABLE DEVICE | Site: FOOT | Status: FUNCTIONAL
Brand: DARTFIRE EDGE

## 2023-01-04 DEVICE — CANNULATED SCREW
Type: IMPLANTABLE DEVICE | Site: FOOT | Status: FUNCTIONAL
Brand: DARTFIRE EDGE

## 2023-01-04 DEVICE — IMPLANTABLE DEVICE
Type: IMPLANTABLE DEVICE | Site: FOOT | Status: FUNCTIONAL
Brand: FUSEFORCE

## 2023-01-04 RX ORDER — FENTANYL CITRATE 50 UG/ML
INJECTION, SOLUTION INTRAMUSCULAR; INTRAVENOUS PRN
Status: DISCONTINUED | OUTPATIENT
Start: 2023-01-04 | End: 2023-01-04 | Stop reason: SDUPTHER

## 2023-01-04 RX ORDER — PROPOFOL 10 MG/ML
INJECTION, EMULSION INTRAVENOUS PRN
Status: DISCONTINUED | OUTPATIENT
Start: 2023-01-04 | End: 2023-01-04

## 2023-01-04 RX ORDER — MIDAZOLAM HYDROCHLORIDE 1 MG/ML
INJECTION INTRAMUSCULAR; INTRAVENOUS PRN
Status: DISCONTINUED | OUTPATIENT
Start: 2023-01-04 | End: 2023-01-04 | Stop reason: SDUPTHER

## 2023-01-04 RX ORDER — KETAMINE HYDROCHLORIDE 10 MG/ML
INJECTION INTRAMUSCULAR; INTRAVENOUS PRN
Status: DISCONTINUED | OUTPATIENT
Start: 2023-01-04 | End: 2023-01-04 | Stop reason: SDUPTHER

## 2023-01-04 RX ORDER — KETOROLAC TROMETHAMINE 30 MG/ML
INJECTION, SOLUTION INTRAMUSCULAR; INTRAVENOUS PRN
Status: DISCONTINUED | OUTPATIENT
Start: 2023-01-04 | End: 2023-01-04 | Stop reason: SDUPTHER

## 2023-01-04 RX ORDER — EPHEDRINE SULFATE/0.9% NACL/PF 50 MG/5 ML
SYRINGE (ML) INTRAVENOUS PRN
Status: DISCONTINUED | OUTPATIENT
Start: 2023-01-04 | End: 2023-01-04 | Stop reason: SDUPTHER

## 2023-01-04 RX ORDER — ONDANSETRON 2 MG/ML
INJECTION INTRAMUSCULAR; INTRAVENOUS PRN
Status: DISCONTINUED | OUTPATIENT
Start: 2023-01-04 | End: 2023-01-04 | Stop reason: SDUPTHER

## 2023-01-04 RX ORDER — BUPIVACAINE HYDROCHLORIDE 2.5 MG/ML
INJECTION, SOLUTION EPIDURAL; INFILTRATION; INTRACAUDAL PRN
Status: DISCONTINUED | OUTPATIENT
Start: 2023-01-04 | End: 2023-01-04 | Stop reason: ALTCHOICE

## 2023-01-04 RX ORDER — GLYCOPYRROLATE 0.2 MG/ML
INJECTION INTRAMUSCULAR; INTRAVENOUS PRN
Status: DISCONTINUED | OUTPATIENT
Start: 2023-01-04 | End: 2023-01-04 | Stop reason: SDUPTHER

## 2023-01-04 RX ORDER — PROPOFOL 10 MG/ML
INJECTION, EMULSION INTRAVENOUS CONTINUOUS PRN
Status: DISCONTINUED | OUTPATIENT
Start: 2023-01-04 | End: 2023-01-04 | Stop reason: SDUPTHER

## 2023-01-04 RX ORDER — SODIUM CHLORIDE, SODIUM LACTATE, POTASSIUM CHLORIDE, CALCIUM CHLORIDE 600; 310; 30; 20 MG/100ML; MG/100ML; MG/100ML; MG/100ML
INJECTION, SOLUTION INTRAVENOUS CONTINUOUS
Status: DISCONTINUED | OUTPATIENT
Start: 2023-01-04 | End: 2023-01-04 | Stop reason: HOSPADM

## 2023-01-04 RX ORDER — LIDOCAINE HYDROCHLORIDE 20 MG/ML
INJECTION, SOLUTION INTRAVENOUS PRN
Status: DISCONTINUED | OUTPATIENT
Start: 2023-01-04 | End: 2023-01-04 | Stop reason: SDUPTHER

## 2023-01-04 RX ADMIN — Medication 15 MG: at 08:36

## 2023-01-04 RX ADMIN — GLYCOPYRROLATE 0.2 MG: 0.2 INJECTION, SOLUTION INTRAMUSCULAR; INTRAVENOUS at 08:51

## 2023-01-04 RX ADMIN — KETOROLAC TROMETHAMINE 30 MG: 30 INJECTION, SOLUTION INTRAMUSCULAR at 11:03

## 2023-01-04 RX ADMIN — MIDAZOLAM 2 MG: 1 INJECTION INTRAMUSCULAR; INTRAVENOUS at 07:53

## 2023-01-04 RX ADMIN — SODIUM CHLORIDE, POTASSIUM CHLORIDE, SODIUM LACTATE AND CALCIUM CHLORIDE: 600; 310; 30; 20 INJECTION, SOLUTION INTRAVENOUS at 07:18

## 2023-01-04 RX ADMIN — KETAMINE HYDROCHLORIDE 30 MG: 10 INJECTION INTRAMUSCULAR; INTRAVENOUS at 08:03

## 2023-01-04 RX ADMIN — CEFAZOLIN 2000 MG: 2 INJECTION, POWDER, FOR SOLUTION INTRAMUSCULAR; INTRAVENOUS at 07:57

## 2023-01-04 RX ADMIN — FENTANYL CITRATE 50 MCG: 50 INJECTION INTRAMUSCULAR; INTRAVENOUS at 08:00

## 2023-01-04 RX ADMIN — PROPOFOL INJECTABLE EMULSION 100 MCG/KG/MIN: 10 INJECTION, EMULSION INTRAVENOUS at 08:02

## 2023-01-04 RX ADMIN — Medication 20 MG: at 08:46

## 2023-01-04 RX ADMIN — ONDANSETRON 4 MG: 2 INJECTION INTRAMUSCULAR; INTRAVENOUS at 08:08

## 2023-01-04 RX ADMIN — LIDOCAINE HYDROCHLORIDE 50 MG: 20 INJECTION, SOLUTION INTRAVENOUS at 08:02

## 2023-01-04 RX ADMIN — Medication 10 MG: at 08:25

## 2023-01-04 RX ADMIN — FENTANYL CITRATE 50 MCG: 50 INJECTION INTRAMUSCULAR; INTRAVENOUS at 10:29

## 2023-01-04 RX ADMIN — FENTANYL CITRATE 25 MCG: 50 INJECTION INTRAMUSCULAR; INTRAVENOUS at 09:20

## 2023-01-04 RX ADMIN — SODIUM CHLORIDE, POTASSIUM CHLORIDE, SODIUM LACTATE AND CALCIUM CHLORIDE: 600; 310; 30; 20 INJECTION, SOLUTION INTRAVENOUS at 09:33

## 2023-01-04 RX ADMIN — FENTANYL CITRATE 50 MCG: 50 INJECTION INTRAMUSCULAR; INTRAVENOUS at 11:18

## 2023-01-04 RX ADMIN — Medication 5 MG: at 08:13

## 2023-01-04 RX ADMIN — FENTANYL CITRATE 25 MCG: 50 INJECTION INTRAMUSCULAR; INTRAVENOUS at 08:37

## 2023-01-04 ASSESSMENT — PAIN SCALES - GENERAL
PAINLEVEL_OUTOF10: 0

## 2023-01-04 ASSESSMENT — PAIN - FUNCTIONAL ASSESSMENT: PAIN_FUNCTIONAL_ASSESSMENT: 0-10

## 2023-01-04 NOTE — H&P
Chief Complaint of:  Painful Bunion, free-floating exostosis R 1st MPJ and R 2nd digit hammertoe    Present Illness:  Patient presents for painful Bunion, free-floating exostosis R 1st MPJ and R 2nd digit hammertoe. Pt has failed conservative care and has elected for surgical correction. Past Medical History:   Diagnosis Date    Anemia     Hiatal hernia     History of blood transfusion     Hyperlipidemia     Kidney stones     Migraines     Shingles 2011    Thyroid disease     hypothyroidism     Past Surgical History:   Procedure Laterality Date    COLONOSCOPY      ENDOMETRIAL ABLATION      ENDOSCOPY, COLON, DIAGNOSTIC      MANDIBLE SURGERY      SINUS SURGERY      TONSILLECTOMY       History reviewed. No pertinent family history. No current facility-administered medications on file prior to encounter. Current Outpatient Medications on File Prior to Encounter   Medication Sig Dispense Refill    propranolol (INDERAL LA) 80 MG extended release capsule Take 80 mg by mouth daily For migraines      hydroCHLOROthiazide (HYDRODIURIL) 25 MG tablet Take 25 mg by mouth daily      pravastatin (PRAVACHOL) 40 MG tablet Take 40 mg by mouth daily      zinc 50 MG TABS tablet Take 50 mg by mouth daily      ELDERBERRY PO Take 1,000 mg by mouth daily      SUMAtriptan (IMITREX) 50 MG tablet Take 50 mg by mouth once as needed for Migraine      propranolol (INDERAL) 10 MG tablet Take 10 mg by mouth 3 times daily Patient does not know dosage (Patient not taking: Reported on 12/22/2022)      EPINEPHrine (EPIPEN 2-TIMOTHY) 0.3 MG/0.3ML SOAJ injection Inject 0.3 mLs into the muscle once for 1 dose Use as directed for allergic reaction 0.3 mL 0    Hypertonic Nasal Wash (SINUS RINSE BOTTLE KIT) PACK Use 1-2 times daily as needed for sinus pressure and drainage 1 each 0    levothyroxine (SYNTHROID) 100 MCG tablet Take 100 mcg by mouth daily.          No Known Allergies      PHYSICAL EXAMINATION/GENERAL APPEARANCE:    No change from PCP H&P    HEAD: Normocephalic. No masses, lesions, tenderness or abnormalities    HEART: PMI normal. No lifts, heaves, or thrills. RRR. No murmurs, clicks, gallops, or rubs    LUNGS: clear    ABDOMEN: Abdomen soft, non-tender. BS normal. No masses,  No organomegaly    OTHER SIGNIFICANT FINDINGS:  Pulses palpable. Intact sensation. Painful Bunion, free-floating exostosis R 1st MPJ and R 2nd digit hammertoe. IMPRESSION/INITIAL DIAGNOSIS:  All risks, benefits, alternatives and reasonable complications were discussed with the pt to the pts understanding. No guarantees were given or expressed. All questions were answered to the pts understanding and satisfaction. Informed consent was signed, witnessed and placed in the patients chart. Pt would like to proceed with the procedure as planned.

## 2023-01-04 NOTE — ANESTHESIA POSTPROCEDURE EVALUATION
Department of Anesthesiology  Postprocedure Note    Patient: Anjali Restrepo  MRN: [id number]  YOB: 1970  Date of evaluation: 1/4/2023      Procedure Summary     Date: 01/04/23 Room / Location: [address] [place] [address]    Anesthesia Start: 9363 Anesthesia Stop: 2102    Procedure: CORRECTION OF RIGHT BUNION WITH FIRST TARSO METATARSAL ARTHRODESIS,AKIN'S FIRST MPJ MEDIAL EXOSTECTOMY AND CORRECTION OF SECOND DIGIT HAMMER TOE (CPT 38935,15103,94734,36619) C-ARM,PERCY HADRWARE, [address], SAGITAL SAW, WIRE ,ANKLE TOURNIQUET (Right) Diagnosis:       Bunion of right foot      Hammer toe of right foot      Metatarsal boss, right      Limitation of joint motion of ankle, right      Foot stiffness, right      (Bunion of right foot [M21.611])      (Hammer toe of right foot [M20.41])      (Metatarsal boss, right [M25.774])      (Limitation of joint motion of ankle, right [M25.671])      (Foot stiffness, right [M25.674])    Surgeons: Gale Muhammad DPM Responsible Provider: Silverio Ortiz DO    Anesthesia Type: MAC ASA Status: 2          Anesthesia Type: MAC    Juan José Phase I: Juan José Score: 10    Juan José Phase II: Juan José Score: 10      Anesthesia Post Evaluation    Patient location during evaluation: PACU  Patient participation: complete - patient participated  Level of consciousness: awake and alert  Airway patency: patent  Nausea & Vomiting: no nausea and no vomiting  Complications: no  Cardiovascular status: hemodynamically stable  Respiratory status: acceptable  Hydration status: euvolemic

## 2023-01-04 NOTE — BRIEF OP NOTE
Brief Postoperative Note      Patient: Charmaine Wang  YOB: 1970  MRN: [id number]    Date of Procedure: 1/4/2023    Pre-Op Diagnosis: Bunion of right foot [M21.611]  Hammer toe of right foot [M20.41]  Metatarsal boss, right [M25.774]  Limitation of joint motion of ankle, right [M25.671]  Foot stiffness, right [M25.674]    Post-Op Diagnosis: Same       Procedure(s):  CORRECTION OF RIGHT BUNION WITH FIRST TARSO METATARSAL ARTHRODESIS,AKIN'S FIRST MPJ MEDIAL EXOSTECTOMY AND CORRECTION OF SECOND DIGIT HAMMER TOE (CPT 90501,64516,07456,23502) C-ARM,PERCY HADRWARE, 39 [address], SAGITAL SAW, WIRE ,ANKLE TOURNIQUET    Surgeon(s):  iTgre Perez DPM    Assistant:  Resident: Alex Bonilla DPM    Anesthesia: Monitor Anesthesia Care    Estimated Blood Loss (mL): less than 783     Complications: None    Specimens:   ID Type Source Tests Collected by Time Destination   A : EXOSTOSIS RIGHT FOOT Bone Bone SURGICAL PATHOLOGY [place], [place] 1/4/2023 0902        Implants:  Implant Name Type Inv.  Item Serial No.  Lot No. LRB No. Used Action   DARTFIRE EDGE INSTRUMENT PACK HEADLESS 4.0MM    [employer]COV [id number] Right 1 Implanted   DART FIRE EDGE CANNULATED SCREW     [id number] Right 1 Implanted   PLATE LAPIDUS RT STD - [id number] Screw/Plate/Nail/Jules PLATE LAPIDUS RT STD  [employer]OB[employer] [facility] INC-PMM  Right 1 Implanted   SCREW BNE L16MM DIA3.5MM MARCIE FT ANK TI JOHNNIE FULL THRD FOR - [id number]  SCREW BNE L16MM DIA3.5MM MARCIE FT ANK TI JOHNNIE FULL THRD FOR  [employer]WD  Right 1 Implanted   SCREW BNE L22MM DIA3.5MM MARCIE FT ANK TI JOHNNIE FULL THRD FOR - [id number]  SCREW BNE L22MM DIA3.5MM MARCIE FT ANK TI JOHNNIE FULL THRD FOR  [employer]-WD  Right 1 Implanted   SCREW BNE L14MM DIA3.5MM MARCIE FT ANK TI JOHNNIE FULL THRD FOR - [id number]  SCREW BNE L14MM DIA3.5MM MARCIE FT [employer] BLAIR FULL THRD FOR  [employer]-  Right 1 Implanted   SCREW BNE L24MM DIA3.5MM MARCIE FT ANK TI JOHNNIE FULL THRD FOR - [id number]  SCREW BNE L24MM DIA3.5MM MARCIE FT ANK TI JOHNNIE FULL THRD FOR  [employer]-WD  Right 1 Implanted   STAPLE BNE [id number] NIT SHP MEM COMPRESSIVE FORC DISP - [id number]  STAPLE BNE [id number] NIT SHP MEM COMPRESSIVE [facility] [employer] INC-WD [id number] Right 1 Implanted   WIRE FIX LION KWIRE] [employer]] - [id number]  WIRE FIX LION KWIRE] [employer]]  [employer]-WD  Right 1 Implanted         Drains: * No LDAs found *    Findings: Free-floating osteophyte medial R 1st MPJ measuring 1.0 x 0.8 cm    Electronically signed by Efra Grissom DPM on 1/4/2023 at 11:25 AM

## 2023-01-04 NOTE — DISCHARGE INSTRUCTIONS
General Post-Op Instructions  Dr. Shari LOWE.P.MMirtha  [phone]    Post Operative Footcare Instructions:    1. Keep your dressings clean and dry. A clean dressing promotes healthy healing. 2.   DO NOT remove the dressing from your foot/ankle, unless instructed by your doctor. 3.   Elevate your foot above the level of your heart for the first 3-4 days as much as possible. 4.   Place ice on top of your foot/ankle 15 min. every waking hour for the first 3-4 days. 5.   Take your pain medication, with food,as prescribed. (follow all instructions regarding prescription medications)  6. Wear your surgical shoe at all times, and even to bed the first 3-4 days post-op. 7.   DO NOT take a shower and get your foot wet. 8.   You may take a sponge bath or hang you foot over the side of the bath tub.  9.  Take your temperature daily each morning, and call the doctor if your temp. is higher than                            102.5. 10.  You may Zada Bosworth not walk on your foot as tolerated. 11.  Resume normal diet and medications (unless otherwise instructed by your doctor). 12.  No driving until approved by Dr. Shari London  13. You should have a responsible adult with you for at 24 hrs. ************************************************************************************    1.   Call the office at [phone] to make an appointment. 2.   Your first post-op appointment should be made tomorrow for within 1 week of the surgery. 3.  To contact the doctor, you may do so by calling [phone].   4.   Take it easy for the next few days and remember, If you are good to your foot, it will be                                    good to you.         If any problems occur or if you have any further questions, please call your doctor as soon as possible. If you find that you cannot reach your doctor but feel that your condition needs a doctors attention go to an emergency room.            Infection After Surgery: Care Instructions  Overview  After surgery, an infection is always possible. It doesn't mean that the surgery didn't go well. Because an infection can be serious, your doctor has taken steps to manage it. Your doctor checked the infection and cleaned it if necessary. Your doctor may have made an opening in the area so that the pus can drain out. You may have gauze in the cut so that the area will stay open and keep draining. You may need antibiotics. You will need to follow up with your doctor to make sure the infection has gone away. Follow-up care is a key part of your treatment and safety. Be sure to make and go to all appointments, and call your doctor if you are having problems. It's also a good idea to know your test results and keep a list of the medicines you take. How can you care for yourself at home? Make sure your surgeon knows about the infection, especially if you saw another doctor about your symptoms. If your doctor prescribed antibiotics, take them as directed. Do not stop taking them just because you feel better. You need to take the full course of antibiotics. Ask your doctor if you can take an over-the-counter pain medicine, such as acetaminophen (Tylenol), ibuprofen (Advil, Motrin), or naproxen (Aleve). Be safe with medicines. Read and follow all instructions on the label. Do not take two or more pain medicines at the same time unless the doctor told you to. Many pain medicines have acetaminophen, which is Tylenol. Too much acetaminophen (Tylenol) can be harmful. Prop up the area on a pillow anytime you sit or lie down during the next 3 days. Try to keep it above the level of your heart. This will help reduce swelling. Keep the skin clean and dry. You may have a bandage over the cut (incision). A bandage helps the incision heal and protects it. Your doctor will tell you how to take care of this. Keep it clean and dry. You may have drainage from the wound.   If your doctor told you how to care for your incision, follow your doctor's instructions. If you did not get instructions, follow this general advice:  Wash around the incision with clean water 2 times a day. Don't use hydrogen peroxide or alcohol, which can slow healing. When should you call for help? Call your doctor now or seek immediate medical care if:    You have signs that your infection is getting worse, such as: Increased pain, swelling, warmth, or redness in the area. Red streaks leading from the area. Pus draining from the wound. A new or higher fever. Watch closely for changes in your health, and be sure to contact your doctor if you have any problems. Where can you learn more? Go to http://www.woods.com/ and enter C340 to learn more about \"Infection After Surgery: Care Instructions. \"  Current as of: January 20, 2022               Content Version: 13.5  © 9708-1354 [employer]. Care instructions adapted under license by [facility] ([facility]). If you have questions about a medical condition or this instruction, always ask your healthcare professional. [place] [address] any warranty or liability for your use of this information. Nausea and Vomiting After Surgery: Care Instructions  Your Care Instructions     After you've had surgery, you may feel sick to your stomach (nauseated) or you may vomit. Sometimes anesthesia can make you feel sick. It's a common side effect and often doesn't last long. Pain also can make you feel sick or vomit. After the anesthesia wears off, you may feel pain from the incision (cut). That pain could then upset your stomach. Taking pain medicine can also make you feel sick to your stomach. Whatever the cause, you may get medicine that can help. There are also some things you can do at home to prevent nausea and feel better. The doctor has checked you carefully, but problems can develop later.  If you notice any problems or new symptoms, get medical treatment right away. Follow-up care is a key part of your treatment and safety. Be sure to make and go to all appointments, and call your doctor if you are having problems. It's also a good idea to know your test results and keep a list of the medicines you take. How can you care for yourself at home? Be safe with medicines. Read and follow all instructions on the label. If the doctor gave you a prescription medicine for pain, take it as prescribed. If you are not taking a prescription pain medicine, ask your doctor if you can take an over-the-counter medicine. Take your pain medicine as soon as you have pain. It works better if you take it before the pain gets bad. Call your doctor if you have any problems with your medicine. Rest in bed until you feel better. To prevent dehydration, drink plenty of fluids. Choose water and other clear liquids until you feel better. If you have kidney, heart, or liver disease and have to limit fluids, talk with your doctor before you increase the amount of fluids you drink. When you are able to eat, try clear soups, mild foods, and liquids until all symptoms are gone for 12 to 48 hours. Other good choices include dry toast, crackers, cooked cereal, and gelatin dessert, such as Jell-O. Do not smoke. Smoking and being around smoke can make nausea worse. If you need help quitting, talk to your doctor about stop-smoking programs and medicines. These can increase your chances of quitting for good. When should you call for help? Call 911  anytime you think you may need emergency care. For example, call if:    You passed out (lost consciousness). Call your doctor now or seek immediate medical care if:    You have new or worse nausea or vomiting. You are too sick to your stomach to drink any fluids. You cannot keep down fluids. You have symptoms of dehydration, such as:  Dry eyes and a dry mouth. Passing only a little urine.   Feeling thirstier than usual. Your pain medicine is not helping. You are dizzy or lightheaded, or you feel like you may faint. Watch closely for changes in your health, and be sure to contact your doctor if:    You do not get better as expected. Current as of: March 9, 2022               Content Version: 13.5  © 6649-9510 [employer]. Care instructions adapted under license by [facility] ([facility]). If you have questions about a medical condition or this instruction, always ask your healthcare professional. [address] any warranty or liability for your use of this information.

## 2023-01-04 NOTE — OP NOTE
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]  Limitation of joint motion of ankle, right [M25.671]  Foot stiffness, right [M25.674]    Post-Op Diagnosis: Same       Procedure(s):  CORRECTION OF RIGHT BUNION WITH FIRST TARSO METATARSAL ARTHRODESIS,AKIN'S FIRST MPJ MEDIAL EXOSTECTOMY AND CORRECTION OF SECOND DIGIT HAMMER TOE (CPT 32312,20178,40371,47891) C-ARM,PERCY HADRWARE, [address], SAGITAL SAW, WIRE ,ANKLE TOURNIQUET    Surgeon(s):  Aleksandr Chavez DPM    Assistant:   Resident: Slade Vaca DPM    Anesthesia: Monitor Anesthesia Care    Estimated Blood Loss (mL): Less than 613    Complications: None     Specimens:   ID Type Source Tests Collected by Time Destination   A : EXOSTOSIS RIGHT FOOT Bone Bone SURGICAL PATHOLOGY Aleksandr Chavez [facility] 1/4/2023 0902        Implants:  Implant Name Type Inv.  Item Serial No.  Lot No. LRB No. Used Action   DARTFIRE EDGE INSTRUMENT PACK HEADLESS 4.0MM    [employer]COV [id number] Right 1 Implanted   DART FIRE EDGE CANNULATED SCREW     [id number] Right 1 Implanted   PLATE [employer] RT STD - [id number] Screw/Plate/Nail/Jules PLATE LAPIDUS RT STD  [employer]  Right 1 Implanted   SCREW BNE L16MM DIA3.5MM MARCIE FT ANK TI JOHNNIE FULL THRD FOR - [id number]  SCREW BNE L16MM DIA3.5MM MARCIE FT ANK TI JOHNNIE FULL THRD FOR  [employer]WD  Right 1 Implanted   SCREW BNE L22MM DIA3.5MM MARCIE FT ANK TI JOHNNIE FULL THRD FOR - [id number]  SCREW BNE L22MM DIA3.5MM MARCIE FT ANK TI JOHNNIE FULL THRD FOR  [employer]WD  Right 1 Implanted   SCREW BNE L14MM DIA3.5MM MARCIE FT ANK TI JOHNNIE FULL THRD FOR - [id number]  SCREW BNE L14MM DIA3.5MM MARCIE FT ANK TI JOHNNIE FULL THRD FOR  [employer] [facility]-WD  Right 1 Implanted   SCREW BNE L24MM DIA3.5MM MARCIE FT ANK TI JOHNNIE FULL THRD FOR - [id number]  SCREW BNE L24MM DIA3.5MM MARCIE FT ANK TI JOHNNIE FULL THRD FOR  [employer]-  Right 1 Implanted   STAPLE BNE [id number] NIT SHP MEM COMPRESSIVE FOR DISP - [id number]  STAPLE BNE [id number] NIT SHP MEM COMPRESSIVE [facility] - [facility]WD [id number] Right 1 Implanted   WIRE FIX LION KWIRE] [employer]] - [id number]  WIRE FIX LION KWIRE] [employer]]  [employer]WD  Right 1 Implanted         Drains: * No LDAs found *    Findings: Free floating osteophyte medial R 1st MPJ    Detailed Description of Procedure: This was patient brought into the operating room, properly identified and placed in a supine position on the OR table. Following anesthesia administration, attention was directed to the right hallux where local anesthetic was injected in a ring like fashion around the hallux. The right foot was then  prepped draped in the usual sterile and aseptic manner. The previously applied right ankle tourniquet was inflated to 250mmhg and the following procedures were performed:    Removal of Free Floating Osteophyte, medial 1st MPJ  Attention was directed to the right first digit where a noticeable bump was observed. C-arm was used to localize the free-floating osteophyte medially over the metatarsal head. A medial linear incision was made over the medial 1st MPJ. Incision was made down to bone using both blunt and sharp dissection. Care was taken to cauterize and ligate all structures as necessary. A free floating osteophyte was then noted to the medial aspect of the 1st MPJ. All soft tissue structures were freed using a 15 blade and the osteophyte was loosened from neighboring structures in total and passed off the field. fluoroscopy was used to confirm complete removal of osteophyte.      First Tarsometatarsal Joint Arthrodesis   Attention was directed to the dorsal right medial foot where a dorsomedial incision was made of the first tarsometatarsal joint, medial to the extensor hallucis longus tendon. Once adequate exposure was achieved, a linear incision was made over the 1st webspace for the lateral release. Lateral release was achieved by adequately releasing the adductor tendon, lateral 1st metatarsal phalangeal joint and fibular sesamoid ligaments. Fluoroscopy was used to check alignment. Once the lateral release was performed, the first tarsometatarsal joint was distracted by placing Samy pins in the dorsal medial cuneiform and the base of the 1st metatarsal. Attention was then directed towards prepping the joint. Once the cartilage was scraped from the 1st metatarsal and medial cuneiform articular surfaces, attention was directed towards fenestrating. A stab incision was made over the 2nd metatarsal head and the lapiFuse Clamp was then used for multi-planar correction. Once the IM and Hallux abductus angle was adequately reduced with the clamp, a compression screw was placed obliquely from the 1st metatarsal to the 2nd cuneiform satisfactory placement was confirmed via fluoroscopy. A plate was then placed dorsomedially over the 1st tarsometatarsal joint. The plate was fixed to the bone with locking screws. Satisfactory alignment of plate was confirmed with fluoroscopy     Jaquan  Attention was directed to the proximal phalanx where a wedge osteotomy, with the apex oriented laterally, was drawn using a skin marker. A sagittal saw was used to create a wedge osteotomy, taking care to preserve the lateral cortex of the proximal phalanx. The wedge of bone was removed from the foot and passed from the field. The osteotomy site was reduced and staple placed to complete reduction. 2nd Digit Arthrodesis   Attention was directed to the right second toe. 2 semi-elliptical incisions were made overlying the PIPJ. The incision was deepened for its entirety to the level of the extensor tendon and capsule surrounding the PIPJ.  Care was taken to preserve all neurovascular structures and cauterize any bleeders. A transverse incision was made into the extensor tendon and capsule, exposing the head of the proximal phalanx. The extensor tendon was retracted dorsally and the capsular structures were freed from the head of the proximal phalanx. The joint was then adequately prepped. A kwire was driven into the head of the proximal phalanx and then into the base of the middle phalanx and distal phalanx out of the end of the toe. The K-wire was retrograded into the proximal phalanx and the arthrodesis site was noticed to be reduced and stabilized. Position of the toe and k-wire was verified using a C-Arm. The K-wire was bent, cut and a pin cover was placed over the pin. The wounds were flushed with copious amounts of normal saline and attention was directed towards closure. 3-0 Vicryl, 4-0 monocril, and 3-0 nylon were utilized for closure. A postoperative injection was performed. A dressing consisting of jumpstart, ABDs, gauze, kerlix and a posterior splint were applied. The tourniquet was then deflated. Vital signs were stable and vascular status intact to the right foot. The patient tolerated the procedure well and was taken back to PACU.       Electronically signed by Rob Gant DPM on 1/4/2023 at 11:28 AM

## 2025-05-15 ENCOUNTER — TRANSCRIBE ORDERS (OUTPATIENT)
Dept: ADMINISTRATIVE | Age: 55
End: 2025-05-15

## 2025-05-15 DIAGNOSIS — Z12.31 ENCOUNTER FOR SCREENING MAMMOGRAM FOR MALIGNANT NEOPLASM OF BREAST: Primary | ICD-10-CM

## (undated) DEVICE — SYRINGE MED 10ML LUERLOCK TIP W/O SFTY DISP

## (undated) DEVICE — K-WIRE BLUNT/TROCAR
Type: IMPLANTABLE DEVICE | Site: FOOT | Status: NON-FUNCTIONAL
Removed: 2023-01-04

## (undated) DEVICE — DRILL BIT

## (undated) DEVICE — ELECTRODE PT RET AD L9FT HI MOIST COND ADH HYDRGEL CORDED

## (undated) DEVICE — TOWEL,OR,DSP,ST,BLUE,STD,6/PK,12PK/CS: Brand: MEDLINE

## (undated) DEVICE — CLOTH SURG PREP PREOPERATIVE CHLORHEXIDINE GLUC 2% READYPREP

## (undated) DEVICE — Device

## (undated) DEVICE — PENCIL ES L3M BTTN SWCH HOLSTER W/ BLDE ELECTRD EDGE

## (undated) DEVICE — DRESSING ALG W1.5XL8IN ANTIMIC WND JUMPSTART

## (undated) DEVICE — INTENDED FOR TISSUE SEPARATION, AND OTHER PROCEDURES THAT REQUIRE A SHARP SURGICAL BLADE TO PUNCTURE OR CUT.: Brand: BARD-PARKER ® STAINLESS STEEL BLADES

## (undated) DEVICE — K-WIRE
Type: IMPLANTABLE DEVICE | Site: FOOT | Status: NON-FUNCTIONAL
Removed: 2023-01-04

## (undated) DEVICE — BANDAGE COMPR W6INXL12FT SMOOTH FOR LIMB EXSANG ESMARCH

## (undated) DEVICE — STERILE LATEX POWDER-FREE SURGICAL GLOVESWITH NITRILE COATING: Brand: PROTEXIS

## (undated) DEVICE — MARKER,SKIN,WI/RULER AND LABELS: Brand: MEDLINE

## (undated) DEVICE — GAUZE,SPONGE,4"X4",16PLY,STRL,LF,10/TRAY: Brand: MEDLINE

## (undated) DEVICE — COTTON UNDERCAST PADDING,REGULAR FINISH: Brand: WEBRIL

## (undated) DEVICE — GAUZE,SPONGE,4"X4",16PLY,XRAY,STRL,LF: Brand: MEDLINE

## (undated) DEVICE — SPONGE LAP W18XL18IN WHT COT 4 PLY FLD STRUNG RADPQ DISP ST

## (undated) DEVICE — DRESSING GZ XRFRM 4X4(25/BX 6BX/CS)

## (undated) DEVICE — COVER,LIGHT HANDLE,FLX,1/PK: Brand: MEDLINE INDUSTRIES, INC.

## (undated) DEVICE — BANDAGE,GAUZE,BULKEE II,4.5"X4.1YD,STRL: Brand: MEDLINE

## (undated) DEVICE — JOINT PREP INSTRUMENT KIT: Brand: ORTHOLOC™ 2